# Patient Record
Sex: FEMALE | Race: WHITE | NOT HISPANIC OR LATINO
[De-identification: names, ages, dates, MRNs, and addresses within clinical notes are randomized per-mention and may not be internally consistent; named-entity substitution may affect disease eponyms.]

---

## 2017-03-11 ENCOUNTER — TRANSCRIPTION ENCOUNTER (OUTPATIENT)
Age: 52
End: 2017-03-11

## 2017-08-25 ENCOUNTER — TRANSCRIPTION ENCOUNTER (OUTPATIENT)
Age: 52
End: 2017-08-25

## 2018-06-18 ENCOUNTER — TRANSCRIPTION ENCOUNTER (OUTPATIENT)
Age: 53
End: 2018-06-18

## 2021-09-07 PROBLEM — Z00.00 ENCOUNTER FOR PREVENTIVE HEALTH EXAMINATION: Status: ACTIVE | Noted: 2021-09-07

## 2021-09-08 ENCOUNTER — APPOINTMENT (OUTPATIENT)
Dept: ENDOCRINOLOGY | Facility: CLINIC | Age: 56
End: 2021-09-08
Payer: MEDICARE

## 2021-09-08 VITALS — HEART RATE: 54 BPM | BODY MASS INDEX: 30.36 KG/M2 | WEIGHT: 205 LBS | HEIGHT: 69 IN | OXYGEN SATURATION: 99 %

## 2021-09-08 VITALS — DIASTOLIC BLOOD PRESSURE: 62 MMHG | SYSTOLIC BLOOD PRESSURE: 118 MMHG

## 2021-09-08 DIAGNOSIS — Z82.49 FAMILY HISTORY OF ISCHEMIC HEART DISEASE AND OTHER DISEASES OF THE CIRCULATORY SYSTEM: ICD-10-CM

## 2021-09-08 DIAGNOSIS — Z83.49 FAMILY HISTORY OF OTHER ENDOCRINE, NUTRITIONAL AND METABOLIC DISEASES: ICD-10-CM

## 2021-09-08 DIAGNOSIS — F17.200 NICOTINE DEPENDENCE, UNSPECIFIED, UNCOMPLICATED: ICD-10-CM

## 2021-09-08 DIAGNOSIS — E55.9 VITAMIN D DEFICIENCY, UNSPECIFIED: ICD-10-CM

## 2021-09-08 DIAGNOSIS — Z86.39 PERSONAL HISTORY OF OTHER ENDOCRINE, NUTRITIONAL AND METABOLIC DISEASE: ICD-10-CM

## 2021-09-08 DIAGNOSIS — Z87.09 PERSONAL HISTORY OF OTHER DISEASES OF THE RESPIRATORY SYSTEM: ICD-10-CM

## 2021-09-08 DIAGNOSIS — Z80.41 FAMILY HISTORY OF MALIGNANT NEOPLASM OF OVARY: ICD-10-CM

## 2021-09-08 DIAGNOSIS — Z78.9 OTHER SPECIFIED HEALTH STATUS: ICD-10-CM

## 2021-09-08 PROCEDURE — 99204 OFFICE O/P NEW MOD 45 MIN: CPT

## 2021-09-27 ENCOUNTER — LABORATORY RESULT (OUTPATIENT)
Age: 56
End: 2021-09-27

## 2021-10-10 RX ORDER — ATENOLOL 25 MG/1
25 TABLET ORAL
Refills: 0 | Status: ACTIVE | COMMUNITY

## 2021-10-10 RX ORDER — BUDESONIDE AND FORMOTEROL FUMARATE DIHYDRATE 160; 4.5 UG/1; UG/1
160-4.5 AEROSOL RESPIRATORY (INHALATION)
Refills: 0 | Status: ACTIVE | COMMUNITY

## 2021-10-10 NOTE — REVIEW OF SYSTEMS
[Fatigue] : fatigue [Negative] : Heme/Lymph [FreeTextEntry7] : coloscnopy this year  found redundant colon

## 2021-10-10 NOTE — ASSESSMENT
[FreeTextEntry1] : Hypothyroidism due to Hashimoto's thyroiditis\par \par 1.  Hypothyroidism\par Continue current Rx\par \par Occasional hoarseness and feeling like something stuck in her throat–see ENT\par \par Question allergy to [place] vaccine which she had a red rash all over her body\par See PMD\par \par Family history ofprediabetes check A1c

## 2021-10-10 NOTE — HISTORY OF PRESENT ILLNESS
[FreeTextEntry1] : Patient here for evaluation of her thyroid,\par Patient has a history of hypothyroidism and was seeing Dr. Acevedo \par Patient had initially been diagnosed with hypothyroidism for years postpartum\par \par Taking Synthroid 0.112 mg p.o. daily, taking it correctly\par \par Past medical history\par  hypothyroidism x20 years\par SVTs seeing cardiology\par Hypotension\par Asthma\par \par Past surgical history\par ASD repair age 28\par Tonsillectomy\par Breast lumpectomy\par Precancerous cells in her left breast, right LCIS and DCIS, sees GYN and breast surgeon every 6 months\par \par Family history\par Grandmother breast cancer age 40 \par Mom ovarian cancer\par Sister hypothyroidism\par Brother heart condition\par Mom prediabetes\par 2 children healthy\par \par Social history\par \par Patient born and raised in [place]\par No exposures to chemicals or radiation\par Positive smoker, less than 1 pack/day x 30 years\par Occasional glass of wine with dinner\par \par \par Allergies\par  Ceclor and Levaquin\par Both cause throat closing\par \par Medications\par Synthroid–generic–0.112 mg daily\par Symbicort inhaler\par Atenolol 25 mg once per day\par Vitamin D 50,000 IUs once per week\par Vitamin B12 1000 MCG's p.o. daily

## 2022-05-18 ENCOUNTER — APPOINTMENT (OUTPATIENT)
Dept: ENDOCRINOLOGY | Facility: CLINIC | Age: 57
End: 2022-05-18

## 2022-11-21 ENCOUNTER — NON-APPOINTMENT (OUTPATIENT)
Age: 57
End: 2022-11-21

## 2022-11-30 ENCOUNTER — APPOINTMENT (OUTPATIENT)
Dept: ENDOCRINOLOGY | Facility: CLINIC | Age: 57
End: 2022-11-30

## 2022-11-30 VITALS — DIASTOLIC BLOOD PRESSURE: 80 MMHG | OXYGEN SATURATION: 97 % | SYSTOLIC BLOOD PRESSURE: 122 MMHG | HEART RATE: 71 BPM

## 2022-11-30 VITALS — WEIGHT: 208 LBS | HEIGHT: 69 IN | BODY MASS INDEX: 30.81 KG/M2

## 2022-11-30 PROCEDURE — 99214 OFFICE O/P EST MOD 30 MIN: CPT

## 2022-11-30 NOTE — ASSESSMENT
[FreeTextEntry1] : Hypothyroidism due to Hashimoto's thyroiditis\par \par 1.  Hypothyroidism\par Continue current Rx\par labs reviewd - TSH wnl\par  can recheck TSH free T4 and free T3\par check sono thyroid\par \par high chol- pt has tried statins- inotlerant due to myalgias- cardiology issending over new RX that is not a statin \par \par inc Hgb HCT - see PMD_ ? excess iron - \par fatigue  see PMD / possible sleep study needed especially in light of elevated  Hgb HCT \par \par \par

## 2022-11-30 NOTE — HISTORY OF PRESENT ILLNESS
[FreeTextEntry1] : follow up Hypothyroidism \par presented postpartum with hypothryoidsm \par \par Taking Synthroid 0.112 mg p.o. daily, taking it correctly\par \par Past medical history\par  hypothyroidism x20 years\par SVTs seeing cardiology\par Hypotension\par Asthma\par \par Past surgical history\par ASD repair age 28\par Tonsillectomy\par Breast lumpectomy\par Precancerous cells in her left breast, right LCIS and DCIS, sees GYN and breast surgeon every 6 months\par \par Family history\par Grandmother breast cancer age 40 \par Mom ovarian cancer\par Sister hypothyroidism\par Brother heart condition\par Mom prediabetes\par 2 children healthy\par \par Social history\par \par Patient born and raised in [place]\par No exposures to chemicals or radiation\par Positive smoker, less than 1 pack/day x 30 years\par Occasional glass of wine with dinner\par \par \par Allergies\par  Ceclor and Levaquin\par Both cause throat closing\par \par Medications\par Synthroid–generic–0.112 mg daily\par Symbicort inhaler\par Atenolol 25 mg once per day\par Vitamin D 50,000 IUs once per week\par Vitamin B12 1000 MCG's p.o. daily

## 2023-11-29 ENCOUNTER — APPOINTMENT (OUTPATIENT)
Dept: ENDOCRINOLOGY | Facility: CLINIC | Age: 58
End: 2023-11-29
Payer: MEDICARE

## 2023-11-29 VITALS
BODY MASS INDEX: 29.92 KG/M2 | HEIGHT: 69 IN | SYSTOLIC BLOOD PRESSURE: 118 MMHG | WEIGHT: 202 LBS | DIASTOLIC BLOOD PRESSURE: 70 MMHG

## 2023-11-29 VITALS — HEART RATE: 71 BPM | OXYGEN SATURATION: 98 %

## 2023-11-29 DIAGNOSIS — C50.912 MALIGNANT NEOPLASM OF UNSPECIFIED SITE OF LEFT FEMALE BREAST: ICD-10-CM

## 2023-11-29 PROCEDURE — 99213 OFFICE O/P EST LOW 20 MIN: CPT

## 2024-06-06 ENCOUNTER — APPOINTMENT (OUTPATIENT)
Dept: ENDOCRINOLOGY | Facility: CLINIC | Age: 59
End: 2024-06-06
Payer: MEDICARE

## 2024-06-06 VITALS
DIASTOLIC BLOOD PRESSURE: 72 MMHG | WEIGHT: 203 LBS | BODY MASS INDEX: 30.07 KG/M2 | SYSTOLIC BLOOD PRESSURE: 120 MMHG | HEIGHT: 69 IN | OXYGEN SATURATION: 94 % | HEART RATE: 74 BPM

## 2024-06-06 DIAGNOSIS — E55.9 VITAMIN D DEFICIENCY, UNSPECIFIED: ICD-10-CM

## 2024-06-06 DIAGNOSIS — E53.8 DEFICIENCY OF OTHER SPECIFIED B GROUP VITAMINS: ICD-10-CM

## 2024-06-06 DIAGNOSIS — E03.8 OTHER SPECIFIED HYPOTHYROIDISM: ICD-10-CM

## 2024-06-06 PROCEDURE — G2211 COMPLEX E/M VISIT ADD ON: CPT

## 2024-06-06 PROCEDURE — 99213 OFFICE O/P EST LOW 20 MIN: CPT

## 2024-06-06 RX ORDER — ERGOCALCIFEROL 1.25 MG/1
1.25 MG CAPSULE, LIQUID FILLED ORAL
Refills: 0 | Status: DISCONTINUED | COMMUNITY
End: 2024-06-06

## 2024-06-06 RX ORDER — LEVOTHYROXINE SODIUM 0.11 MG/1
112 TABLET ORAL
Qty: 90 | Refills: 2 | Status: ACTIVE | COMMUNITY
Start: 1900-01-01 | End: 1900-01-01

## 2024-06-06 NOTE — HISTORY OF PRESENT ILLNESS
[FreeTextEntry1] : Follow up Hypothyroidism  History: Started post partum Taking Synthroid 112 mcg daily, taking it correctly S/p XRT for newly dx breast CA ER + PA + will start anti estrogen therapy had lumpectomy left breast   Past medical history hypothyroidism x20 years SVTs seeing cardiology Hypotension Asthma  Past surgical history ASD repair age 28 Tonsillectomy Breast lumpectomy Precancerous cells in her left breast, right LCIS and DCIS, sees GYN and breast surgeon every 6 months

## 2024-06-06 NOTE — ASSESSMENT
[FreeTextEntry1] : Hypothyroidism. Clinically euthyroid Biochemically euthyroid Good pill technique (early in the morning on empty stomach) On LT4 112 mcg daily, continue  No nodules on US 2023  Vit D deficiency Check vit D level  On multivitamins Take vit D 1000 daily over the counter   Dysglycemia on CMP  Check CMP, A1c  and lipids   I spent 35 minutes discussing with patient face to face and non face to face reviewing documentations, labs, and/or imaging, also discussing the management plans.   RTC yearly

## 2024-06-28 ENCOUNTER — NON-APPOINTMENT (OUTPATIENT)
Age: 59
End: 2024-06-28

## 2024-08-25 ENCOUNTER — NON-APPOINTMENT (OUTPATIENT)
Age: 59
End: 2024-08-25

## 2025-01-16 ENCOUNTER — NON-APPOINTMENT (OUTPATIENT)
Age: 60
End: 2025-01-16

## 2025-06-09 ENCOUNTER — NON-APPOINTMENT (OUTPATIENT)
Age: 60
End: 2025-06-09

## 2025-06-11 ENCOUNTER — APPOINTMENT (OUTPATIENT)
Dept: ENDOCRINOLOGY | Facility: CLINIC | Age: 60
End: 2025-06-11
Payer: MEDICARE

## 2025-06-11 VITALS
OXYGEN SATURATION: 98 % | SYSTOLIC BLOOD PRESSURE: 107 MMHG | WEIGHT: 199 LBS | DIASTOLIC BLOOD PRESSURE: 68 MMHG | HEART RATE: 72 BPM | HEIGHT: 69 IN | BODY MASS INDEX: 29.47 KG/M2

## 2025-06-11 PROBLEM — E78.5 HYPERLIPIDEMIA, UNSPECIFIED HYPERLIPIDEMIA TYPE: Status: ACTIVE | Noted: 2025-06-11

## 2025-06-11 PROCEDURE — 99214 OFFICE O/P EST MOD 30 MIN: CPT

## 2025-06-11 PROCEDURE — G2211 COMPLEX E/M VISIT ADD ON: CPT

## 2025-08-02 ENCOUNTER — NON-APPOINTMENT (OUTPATIENT)
Age: 60
End: 2025-08-02